# Patient Record
Sex: MALE | Race: WHITE | NOT HISPANIC OR LATINO | URBAN - METROPOLITAN AREA
[De-identification: names, ages, dates, MRNs, and addresses within clinical notes are randomized per-mention and may not be internally consistent; named-entity substitution may affect disease eponyms.]

---

## 2021-06-21 ENCOUNTER — OFFICE VISIT (OUTPATIENT)
Dept: FAMILY MEDICINE | Facility: CLINIC | Age: 36
End: 2021-06-21

## 2021-06-21 VITALS
DIASTOLIC BLOOD PRESSURE: 92 MMHG | HEIGHT: 68 IN | SYSTOLIC BLOOD PRESSURE: 128 MMHG | WEIGHT: 187 LBS | HEART RATE: 79 BPM | OXYGEN SATURATION: 99 % | BODY MASS INDEX: 28.34 KG/M2

## 2021-06-21 DIAGNOSIS — F41.9 ANXIETY: ICD-10-CM

## 2021-06-21 DIAGNOSIS — Z13.228 ENCOUNTER FOR SCREENING FOR METABOLIC DISORDER: ICD-10-CM

## 2021-06-21 DIAGNOSIS — D64.9 ANEMIA: ICD-10-CM

## 2021-06-21 DIAGNOSIS — F32.1 CURRENT MODERATE EPISODE OF MAJOR DEPRESSIVE DISORDER WITHOUT PRIOR EPISODE (H): ICD-10-CM

## 2021-06-21 DIAGNOSIS — Z13.220 SCREENING FOR LIPOID DISORDERS: ICD-10-CM

## 2021-06-21 DIAGNOSIS — Z11.59 NEED FOR HEPATITIS C SCREENING TEST: ICD-10-CM

## 2021-06-21 DIAGNOSIS — Z13.0 ENCOUNTER FOR SCREENING FOR HEMATOLOGIC DISORDER: ICD-10-CM

## 2021-06-21 DIAGNOSIS — Z00.00 ROUTINE GENERAL MEDICAL EXAMINATION AT A HEALTH CARE FACILITY: Primary | ICD-10-CM

## 2021-06-21 LAB
% GRANULOCYTES: 74.3 % (ref 42.2–75.2)
HCT VFR BLD AUTO: 35.2 % (ref 39–51)
HEMOGLOBIN: 12.2 G/DL (ref 13.4–17.5)
LYMPHOCYTES NFR BLD AUTO: 19.7 % (ref 20.5–51.1)
MCH RBC QN AUTO: 28.8 PG (ref 27–31)
MCHC RBC AUTO-ENTMCNC: 34.8 G/DL (ref 33–37)
MCV RBC AUTO: 82.6 FL (ref 80–100)
MONOCYTES NFR BLD AUTO: 6 % (ref 1.7–9.3)
PLATELET # BLD AUTO: 236 K/UL (ref 140–450)
RBC # BLD AUTO: 4.26 X10/CMM (ref 4.2–5.9)
WBC # BLD AUTO: 5.9 X10/CMM (ref 3.8–11)

## 2021-06-21 PROCEDURE — 99214 OFFICE O/P EST MOD 30 MIN: CPT | Mod: 25 | Performed by: FAMILY MEDICINE

## 2021-06-21 PROCEDURE — 99385 PREV VISIT NEW AGE 18-39: CPT | Performed by: FAMILY MEDICINE

## 2021-06-21 PROCEDURE — 85025 COMPLETE CBC W/AUTO DIFF WBC: CPT | Performed by: FAMILY MEDICINE

## 2021-06-21 PROCEDURE — 36415 COLL VENOUS BLD VENIPUNCTURE: CPT | Performed by: FAMILY MEDICINE

## 2021-06-21 RX ORDER — ESCITALOPRAM OXALATE 5 MG/1
TABLET ORAL
Qty: 90 TABLET | Refills: 0 | Status: SHIPPED | OUTPATIENT
Start: 2021-06-21 | End: 2023-04-03

## 2021-06-21 ASSESSMENT — ANXIETY QUESTIONNAIRES
GAD7 TOTAL SCORE: 17
1. FEELING NERVOUS, ANXIOUS, OR ON EDGE: NEARLY EVERY DAY
5. BEING SO RESTLESS THAT IT IS HARD TO SIT STILL: MORE THAN HALF THE DAYS
3. WORRYING TOO MUCH ABOUT DIFFERENT THINGS: NEARLY EVERY DAY
7. FEELING AFRAID AS IF SOMETHING AWFUL MIGHT HAPPEN: NOT AT ALL
2. NOT BEING ABLE TO STOP OR CONTROL WORRYING: NEARLY EVERY DAY
6. BECOMING EASILY ANNOYED OR IRRITABLE: NEARLY EVERY DAY

## 2021-06-21 ASSESSMENT — MIFFLIN-ST. JEOR: SCORE: 1738.76

## 2021-06-21 ASSESSMENT — PATIENT HEALTH QUESTIONNAIRE - PHQ9
SUM OF ALL RESPONSES TO PHQ QUESTIONS 1-9: 17
5. POOR APPETITE OR OVEREATING: NEARLY EVERY DAY

## 2021-06-21 NOTE — PROGRESS NOTES
"  3  SUBJECTIVE:   CC: Antolin Meza is an 36 year old male who presents for preventive health visit.       Patient has been advised of split billing requirements and indicates understanding: Yes     New patient here for CPE.  He reports increased low mood, worsening concentration and energy, worse sleep and anhedonia for \"a while.\"  No suicidal ideation.  Also significant anxiety and feels mind never stops.  Sleeping only a few hours a night.  Does feel very tired.  No manic symptoms.  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  Last day is in 5 days.  He is going to take some time off and re-evaluate.  No personal history of mental illness.  Has not been on medication in past.    Healthy Habits:    Do you get at least three servings of calcium containing foods daily (dairy, green leafy vegetables, etc.)? yes    Amount of exercise or daily activities, outside of work: 3-4 day(s) per week    Problems taking medications regularly No    Medication side effects: No    Have you had an eye exam in the past two years? yes    Do you see a dentist twice per year? no    Do you have sleep apnea, excessive snoring or daytime drowsiness? yes, drowsiness      -------------------------------------    Today's PHQ-2 Score:   PHQ-2 (  [employer]) 2021   Q1: Little interest or pleasure in doing things 2   Q2: Feeling down, depressed or hopeless 2   PHQ-2 Score 4       Abuse: Current or Past(Physical, Sexual or Emotional)- No  Do you feel safe in your environment? Yes    Have you ever done Advance Care Planning? (For example, a Health Directive, POLST, or a discussion with a medical provider or your loved ones about your wishes): No, advance care planning information given to patient to review.  Patient plans to discuss their wishes with loved ones or provider.      Social History     Tobacco Use     Smoking status: Former Smoker     Quit date: 2008     Years since quittin.0 " "    Smokeless tobacco: Never Used   Substance Use Topics     Alcohol use: Not on file     If you drink alcohol do you typically have >3 drinks per day or >7 drinks per week? No                      Last PSA: No results found for: PSA    Reviewed orders with patient. Reviewed health maintenance and updated orders accordingly - Yes  Lab work is in process    Reviewed and updated as needed this visit by clinical staff  Tobacco  Allergies  Meds    Surg Hx  Fam Hx          Reviewed and updated as needed this visit by Provider       Surg Hx  Fam Hx         No past medical history on file.   History reviewed. No pertinent surgical history.    ROS:  CONSTITUTIONAL: NEGATIVE for fever, chills, change in weight  INTEGUMENTARY/SKIN: NEGATIVE for worrisome rashes, moles or lesions  EYES: NEGATIVE for vision changes or irritation  ENT: NEGATIVE for ear, mouth and throat problems  RESP: NEGATIVE for significant cough or SOB  CV: NEGATIVE for chest pain, palpitations or peripheral edema  GI: NEGATIVE for nausea, abdominal pain, heartburn, or change in bowel habits   male: negative for dysuria, hematuria, decreased urinary stream, erectile dysfunction, urethral discharge  MUSCULOSKELETAL: NEGATIVE for significant arthralgias or myalgia  NEURO: NEGATIVE for weakness, dizziness or paresthesias    OBJECTIVE:   BP (!) 128/92   Pulse 79   Ht 1.721 m (5' 7.75\")   Wt 84.8 kg (187 lb)   SpO2 99%   BMI 28.64 kg/m    EXAM:  GENERAL: healthy, alert and no distress  EYES: Eyes grossly normal to inspection, PERRL and conjunctivae and sclerae normal  HENT: ear canals and TM's normal, nose and mouth without ulcers or lesions  NECK: no adenopathy, no asymmetry, masses, or scars and thyroid normal to palpation  RESP: lungs clear to auscultation - no rales, rhonchi or wheezes  CV: regular rate and rhythm, normal S1 S2, no S3 or S4, no murmur, click or rub, no peripheral edema and peripheral pulses strong  ABDOMEN: soft, nontender, no " hepatosplenomegaly, no masses and bowel sounds normal   (male): normal male genitalia without lesions or urethral discharge, no hernia  MS: no gross musculoskeletal defects noted, no edema  SKIN: no suspicious lesions or rashes  NEURO: Normal strength and tone, mentation intact and speech normal  PSYCH: mentation appears normal, judgement and insight intact and appearance well groomed    Diagnostic Test Results:  Labs reviewed in Epic    ASSESSMENT/PLAN:   1. Routine general medical examination at a health care facility  - discussed preventative guidelines, healthy diet, exercise and weight management  - VENOUS COLLECTION    2. Screening for lipoid disorders  - Lipid Panel ([facility])  - VENOUS COLLECTION    3. Encounter for screening for metabolic disorder  - Comp. Metabolic Panel (14) ([facility])  - VENOUS COLLECTION    4. Encounter for screening for hematologic disorder  - CBC with Diff/Plt ([facility])  - VENOUS COLLECTION    5. Need for hepatitis C screening test  - HCV Antibody ([facility])  - VENOUS COLLECTION    6. Current moderate episode of major depressive disorder without prior episode (H)  Discussed in detail.  Discussed pharmacologic and nonpharmacologic interventions.  He prefers trial of medication first.  Will consider therapy if not improved.  Also will focus on rest, exercise, diet, etc.    - Proper use and potential benefits, harms and side effects discussed in detail.  - follow up in 4-6 weeks, sooner if concerns  - escitalopram (LEXAPRO) 5 MG tablet; Take 1 tab daily for 2 weeks and then increase to 2 tabs daily if tolerating  Dispense: 90 tablet; Refill: 0  - VENOUS COLLECTION    7. Anxiety  See above  - escitalopram (LEXAPRO) 5 MG tablet; Take 1 tab daily for 2 weeks and then increase to 2 tabs daily if tolerating  Dispense: 90 tablet; Refill: 0  - VENOUS COLLECTION    Patient has been advised of split billing requirements and indicates understanding: Yes  COUNSELING:  Reviewed preventive health  "counseling, as reflected in patient instructions    Estimated body mass index is 28.64 kg/m  as calculated from the following:    Height as of this encounter: 1.721 m (5' 7.75\").    Weight as of this encounter: 84.8 kg (187 lb).    Weight management plan: Discussed healthy diet and exercise guidelines    He reports that he quit smoking about 13 years ago. He has never used smokeless tobacco.      Counseling Resources:  ATP IV Guidelines  Pooled Cohorts Equation Calculator  FRAX Risk Assessment  ICSI Preventive Guidelines  Dietary Guidelines for Americans, 2010  USDA's MyPlate  ASA Prophylaxis  Lung CA Screening    Kenroy Bedolla MD  [facility]  "

## 2021-06-22 LAB
ALBUMIN SERPL-MCNC: 4.8 G/DL (ref 4–5)
ALBUMIN/GLOB SERPL: 1.9 {RATIO} (ref 1.2–2.2)
ALP SERPL-CCNC: 71 IU/L (ref 48–121)
ALT SERPL-CCNC: 25 IU/L (ref 0–44)
AST SERPL-CCNC: 21 IU/L (ref 0–40)
BILIRUB SERPL-MCNC: 0.6 MG/DL (ref 0–1.2)
BUN SERPL-MCNC: 15 MG/DL (ref 6–20)
BUN/CREATININE RATIO: 14 (ref 9–20)
CALCIUM SERPL-MCNC: 9.7 MG/DL (ref 8.7–10.2)
CHLORIDE SERPLBLD-SCNC: 102 MMOL/L (ref 96–106)
CHOLEST SERPL-MCNC: 197 MG/DL (ref 100–199)
CREAT SERPL-MCNC: 1.06 MG/DL (ref 0.76–1.27)
EGFR IF AFRICN AM: 102 ML/MIN/1.73
EGFR IF NONAFRICN AM: 89 ML/MIN/1.73
GLOBULIN, TOTAL: 2.5 G/DL (ref 1.5–4.5)
GLUCOSE SERPL-MCNC: 86 MG/DL (ref 65–99)
HCV AB SERPL QL IA: <0.1 S/CO RATIO (ref 0–0.9)
HDLC SERPL-MCNC: 54 MG/DL
LDL/HDL RATIO: 2.3 RATIO (ref 0–3.6)
LDLC SERPL CALC-MCNC: 125 MG/DL (ref 0–99)
POTASSIUM SERPL-SCNC: 4.4 MMOL/L (ref 3.5–5.2)
PROT SERPL-MCNC: 7.3 G/DL (ref 6–8.5)
SODIUM SERPL-SCNC: 139 MMOL/L (ref 134–144)
TOTAL CO2: 26 MMOL/L (ref 20–29)
TRIGL SERPL-MCNC: 100 MG/DL (ref 0–149)
VLDLC SERPL CALC-MCNC: 18 MG/DL (ref 5–40)

## 2021-06-22 ASSESSMENT — ANXIETY QUESTIONNAIRES: GAD7 TOTAL SCORE: 17

## 2021-06-23 NOTE — NURSING NOTE
Kenroy Bedolla MD sent to [facility]             Please add ferritin and iron/TIBC for Dx: anemia     Thanks!      Added today  Valeria Garcia MA June 23, 2021 12:36 PM

## 2021-06-24 LAB
FERRITIN SERPL-MCNC: 169 NG/ML (ref 30–400)
IRON BINDING CAP: 286 UG/DL (ref 250–450)
IRON SATURATION: 25 % (ref 15–55)
IRON: 72 UG/DL (ref 38–169)
UIBC: 214 UG/DL (ref 111–343)

## 2021-10-17 ENCOUNTER — HEALTH MAINTENANCE LETTER (OUTPATIENT)
Age: 36
End: 2021-10-17

## 2021-11-22 ENCOUNTER — OFFICE VISIT (OUTPATIENT)
Dept: FAMILY MEDICINE | Facility: CLINIC | Age: 36
End: 2021-11-22

## 2021-11-22 VITALS
HEART RATE: 84 BPM | SYSTOLIC BLOOD PRESSURE: 138 MMHG | HEIGHT: 68 IN | TEMPERATURE: 99.1 F | BODY MASS INDEX: 31.1 KG/M2 | OXYGEN SATURATION: 98 % | DIASTOLIC BLOOD PRESSURE: 86 MMHG | RESPIRATION RATE: 16 BRPM | WEIGHT: 205.2 LBS

## 2021-11-22 DIAGNOSIS — R51.9 SUDDEN ONSET OF SEVERE HEADACHE: ICD-10-CM

## 2021-11-22 DIAGNOSIS — R51.9 NEW ONSET HEADACHE: Primary | ICD-10-CM

## 2021-11-22 PROCEDURE — 99214 OFFICE O/P EST MOD 30 MIN: CPT | Performed by: FAMILY MEDICINE

## 2021-11-22 ASSESSMENT — ANXIETY QUESTIONNAIRES
6. BECOMING EASILY ANNOYED OR IRRITABLE: SEVERAL DAYS
7. FEELING AFRAID AS IF SOMETHING AWFUL MIGHT HAPPEN: NOT AT ALL
5. BEING SO RESTLESS THAT IT IS HARD TO SIT STILL: NOT AT ALL
GAD7 TOTAL SCORE: 1
1. FEELING NERVOUS, ANXIOUS, OR ON EDGE: NOT AT ALL
3. WORRYING TOO MUCH ABOUT DIFFERENT THINGS: NOT AT ALL
2. NOT BEING ABLE TO STOP OR CONTROL WORRYING: NOT AT ALL
IF YOU CHECKED OFF ANY PROBLEMS ON THIS QUESTIONNAIRE, HOW DIFFICULT HAVE THESE PROBLEMS MADE IT FOR YOU TO DO YOUR WORK, TAKE CARE OF THINGS AT HOME, OR GET ALONG WITH OTHER PEOPLE: NOT DIFFICULT AT ALL

## 2021-11-22 ASSESSMENT — MIFFLIN-ST. JEOR: SCORE: 1821.31

## 2021-11-22 ASSESSMENT — PATIENT HEALTH QUESTIONNAIRE - PHQ9: 5. POOR APPETITE OR OVEREATING: NOT AT ALL

## 2021-11-22 NOTE — PROGRESS NOTES
"  Fritz Dangelo is a 36 year old patient who presents to clinic for evaluation.  Reports onset of new headache 4 days ago.  Sudden onset but not severe at first.  2 nights ago headache worsened to worst headache he has had.  Was out to dinner and had to go home.  Since then headache has subsided but persisted.  Now rates 3/10.  Right sided behind eye.  Mild nausea.  No photophobia or phonophobia.  Does endorse seeing some visual dots.  No double vision, dysarthria, dysphagia or unilateral weakness.  No chest pain or SOB.        Review of Systems   Constitutional, HEENT, cardiovascular, pulmonary, GI, , musculoskeletal, neuro, skin, endocrine and psych systems are negative, except as otherwise noted.      Objective    /86   Pulse 84   Temp 99.1  F (37.3  C) (Oral)   Resp 16   Ht 1.721 m (5' 7.75\")   Wt 93.1 kg (205 lb 3.2 oz)   SpO2 98%   BMI 31.43 kg/m      General: Well appearing, NAD  HEENT: PERRLA, EOMI, normal fundoscopic exam  Heart: RRR, no murmur  Chest: Lungs CTAB  Skin: Clear  Neuro: CN 2-12 intact, nonfocal exam  Psych: normal mood and affect        Assessment & Plan     New onset headache  Overall well appearing but given new onset headache and severity described 2 days ago recommend imaging.  Does not want to go to ED.  Reassuring appearance and headache has improved.  Recommend CT head without contrast and with for evaluation.  If negative will proceed with migraine treatment.  If worsens prior to imaging today recommend ED immediately.  Patient agrees with plan.  - Radiology Referral; Future    Sudden onset of severe headache  - Radiology Referral; Future    [id number]  Kenroy Bedolla MD  [facility]          "

## 2021-11-23 ASSESSMENT — ANXIETY QUESTIONNAIRES: GAD7 TOTAL SCORE: 1

## 2021-11-23 ASSESSMENT — PATIENT HEALTH QUESTIONNAIRE - PHQ9: SUM OF ALL RESPONSES TO PHQ QUESTIONS 1-9: 3

## 2021-11-23 NOTE — PROGRESS NOTES
11/22/21 faxed this office note to [facility] imaging, Atten: PA department @ [phone]    Cali Kramer,   [facility]  [phone]

## 2021-11-29 ENCOUNTER — TRANSFERRED RECORDS (OUTPATIENT)
Dept: FAMILY MEDICINE | Facility: CLINIC | Age: 36
End: 2021-11-29

## 2021-11-30 DIAGNOSIS — R51.9 NEW ONSET HEADACHE: Primary | ICD-10-CM

## 2021-11-30 RX ORDER — SUMATRIPTAN 50 MG/1
50 TABLET, FILM COATED ORAL
Qty: 30 TABLET | Refills: 0 | Status: SHIPPED | OUTPATIENT
Start: 2021-11-30 | End: 2023-04-03

## 2021-11-30 NOTE — PROGRESS NOTES
Discussed CTA results.  Recommend trial of triptan and neuro referral.  Headache is unchanged.  Will go to ED if worsens

## 2022-07-24 ENCOUNTER — HEALTH MAINTENANCE LETTER (OUTPATIENT)
Age: 37
End: 2022-07-24

## 2022-10-03 ENCOUNTER — HEALTH MAINTENANCE LETTER (OUTPATIENT)
Age: 37
End: 2022-10-03

## 2023-04-02 ASSESSMENT — PATIENT HEALTH QUESTIONNAIRE - PHQ9: 5. POOR APPETITE OR OVEREATING: NOT AT ALL

## 2023-04-02 ASSESSMENT — ENCOUNTER SYMPTOMS
CONSTIPATION: 0
SORE THROAT: 0
JOINT SWELLING: 0
DIARRHEA: 0
COUGH: 0
FEVER: 0
DIZZINESS: 0
HEARTBURN: 1
HEMATURIA: 0
SHORTNESS OF BREATH: 0
NAUSEA: 0
ARTHRALGIAS: 0
PALPITATIONS: 0
EYE PAIN: 0
DYSURIA: 0
NERVOUS/ANXIOUS: 0
HEMATOCHEZIA: 0
FREQUENCY: 1
MYALGIAS: 0
WEAKNESS: 0
CHILLS: 0
PARESTHESIAS: 0
ABDOMINAL PAIN: 0
HEADACHES: 0

## 2023-04-02 ASSESSMENT — ANXIETY QUESTIONNAIRES
IF YOU CHECKED OFF ANY PROBLEMS ON THIS QUESTIONNAIRE, HOW DIFFICULT HAVE THESE PROBLEMS MADE IT FOR YOU TO DO YOUR WORK, TAKE CARE OF THINGS AT HOME, OR GET ALONG WITH OTHER PEOPLE: NOT DIFFICULT AT ALL
2. NOT BEING ABLE TO STOP OR CONTROL WORRYING: NOT AT ALL
6. BECOMING EASILY ANNOYED OR IRRITABLE: SEVERAL DAYS
1. FEELING NERVOUS, ANXIOUS, OR ON EDGE: NOT AT ALL
5. BEING SO RESTLESS THAT IT IS HARD TO SIT STILL: NOT AT ALL
7. FEELING AFRAID AS IF SOMETHING AWFUL MIGHT HAPPEN: NOT AT ALL
3. WORRYING TOO MUCH ABOUT DIFFERENT THINGS: SEVERAL DAYS
GAD7 TOTAL SCORE: 2

## 2023-04-03 ENCOUNTER — OFFICE VISIT (OUTPATIENT)
Dept: FAMILY MEDICINE | Facility: CLINIC | Age: 38
End: 2023-04-03

## 2023-04-03 VITALS
BODY MASS INDEX: 30.17 KG/M2 | HEART RATE: 79 BPM | WEIGHT: 192.2 LBS | DIASTOLIC BLOOD PRESSURE: 95 MMHG | SYSTOLIC BLOOD PRESSURE: 143 MMHG | OXYGEN SATURATION: 98 % | HEIGHT: 67 IN

## 2023-04-03 DIAGNOSIS — Z00.00 ROUTINE GENERAL MEDICAL EXAMINATION AT A HEALTH CARE FACILITY: Primary | ICD-10-CM

## 2023-04-03 DIAGNOSIS — Z13.6 ENCOUNTER FOR SCREENING FOR CARDIOVASCULAR DISORDERS: ICD-10-CM

## 2023-04-03 DIAGNOSIS — D64.9 ANEMIA, UNSPECIFIED TYPE: ICD-10-CM

## 2023-04-03 DIAGNOSIS — R03.0 ELEVATED BLOOD PRESSURE READING WITHOUT DIAGNOSIS OF HYPERTENSION: ICD-10-CM

## 2023-04-03 DIAGNOSIS — Z23 ENCOUNTER FOR IMMUNIZATION: ICD-10-CM

## 2023-04-03 DIAGNOSIS — R74.01 TRANSAMINITIS: ICD-10-CM

## 2023-04-03 DIAGNOSIS — Z13.228 ENCOUNTER FOR SCREENING FOR METABOLIC DISORDER: ICD-10-CM

## 2023-04-03 PROBLEM — F32.1 CURRENT MODERATE EPISODE OF MAJOR DEPRESSIVE DISORDER WITHOUT PRIOR EPISODE (H): Status: RESOLVED | Noted: 2021-06-21 | Resolved: 2023-04-03

## 2023-04-03 PROBLEM — F41.9 ANXIETY: Status: RESOLVED | Noted: 2021-06-21 | Resolved: 2023-04-03

## 2023-04-03 LAB
% GRANULOCYTES: 72.5 % (ref 42.2–75.2)
CHOL/HDL RATIO (RMG): 3.5 MG/DL (ref 0–4.5)
CHOLESTEROL: 183 MG/DL (ref 100–199)
HCT VFR BLD AUTO: 44.9 % (ref 39–51)
HDL (RMG): 53 MG/DL (ref 40–?)
HEMOGLOBIN: 15.3 G/DL (ref 13.4–17.5)
LDL CALCULATED (RMG): 101 MG/DL (ref 0–130)
LYMPHOCYTES NFR BLD AUTO: 21 % (ref 20.5–51.1)
MCH RBC QN AUTO: 28.7 PG (ref 27–31)
MCHC RBC AUTO-ENTMCNC: 34 G/DL (ref 33–37)
MCV RBC AUTO: 84.5 FL (ref 80–100)
MONOCYTES NFR BLD AUTO: 6.5 % (ref 1.7–9.3)
PLATELET # BLD AUTO: 299 K/UL (ref 140–450)
RBC # BLD AUTO: 5.32 X10/CMM (ref 4.2–5.9)
TRIGLYCERIDES (RMG): 150 MG/DL (ref 0–149)
WBC # BLD AUTO: 7.6 X10/CMM (ref 3.8–11)

## 2023-04-03 PROCEDURE — 90715 TDAP VACCINE 7 YRS/> IM: CPT | Performed by: FAMILY MEDICINE

## 2023-04-03 PROCEDURE — 80061 LIPID PANEL: CPT | Mod: QW | Performed by: FAMILY MEDICINE

## 2023-04-03 PROCEDURE — 85025 COMPLETE CBC W/AUTO DIFF WBC: CPT | Performed by: FAMILY MEDICINE

## 2023-04-03 PROCEDURE — 36415 COLL VENOUS BLD VENIPUNCTURE: CPT | Performed by: FAMILY MEDICINE

## 2023-04-03 PROCEDURE — 90471 IMMUNIZATION ADMIN: CPT | Mod: 59 | Performed by: FAMILY MEDICINE

## 2023-04-03 PROCEDURE — 99395 PREV VISIT EST AGE 18-39: CPT | Mod: 25 | Performed by: FAMILY MEDICINE

## 2023-04-03 ASSESSMENT — ENCOUNTER SYMPTOMS
CONSTIPATION: 0
SHORTNESS OF BREATH: 0
FREQUENCY: 1
PARESTHESIAS: 0
NERVOUS/ANXIOUS: 0
DIZZINESS: 0
SORE THROAT: 0
ABDOMINAL PAIN: 0
CHILLS: 0
WEAKNESS: 0
ARTHRALGIAS: 0
DYSURIA: 0
FEVER: 0
EYE PAIN: 0
MYALGIAS: 0
HEARTBURN: 1
HEMATURIA: 0
HEMATOCHEZIA: 0
JOINT SWELLING: 0
NAUSEA: 0
PALPITATIONS: 0
HEADACHES: 0
COUGH: 0
DIARRHEA: 0

## 2023-04-03 ASSESSMENT — ANXIETY QUESTIONNAIRES: GAD7 TOTAL SCORE: 2

## 2023-04-03 ASSESSMENT — PATIENT HEALTH QUESTIONNAIRE - PHQ9: SUM OF ALL RESPONSES TO PHQ QUESTIONS 1-9: 5

## 2023-04-03 NOTE — PROGRESS NOTES
SUBJECTIVE:   CC: Antolin is an 37 year old who presents for preventative health visit.     Doing well.  No concerns.  Mood much improved with new job.    Healthy Habits:     Getting at least 3 servings of Calcium per day:  Yes    Bi-annual eye exam:  Yes    Dental care twice a year:  NO    Sleep apnea or symptoms of sleep apnea:  Daytime drowsiness    Diet:  Regular (no restrictions)    Frequency of exercise:  2-3 days/week    Duration of exercise:  30-45 minutes    Taking medications regularly:  Yes    Medication side effects:  Not applicable    PHQ-2 Total Score: 0    Additional concerns today:  No        -------------------------------------    Today's PHQ-2 Score:       2023     4:17 PM   PHQ-2 (  [employer])   Q1: Little interest or pleasure in doing things 0   Q2: Feeling down, depressed or hopeless 0   PHQ-2 Score 0   Q1: Little interest or pleasure in doing things Not at all   Q2: Feeling down, depressed or hopeless Not at all   PHQ-2 Score 0     Social History     Tobacco Use     Smoking status: Former     Types: Cigarettes     Quit date: 2008     Years since quittin.7     Smokeless tobacco: Never   Vaping Use     Vaping status: Not on file   Substance Use Topics     Alcohol use: Not on file             2023     4:17 PM   Alcohol Use   Prescreen: >3 drinks/day or >7 drinks/week? No       Last PSA: No results found for: PSA    Reviewed orders with patient. Reviewed health maintenance and updated orders accordingly - Yes  Lab work is in process    Reviewed and updated as needed this visit by clinical staff   Tobacco  Allergies  Meds              Reviewed and updated as needed this visit by Provider                     Review of Systems   Constitutional: Negative for chills and fever.   HENT: Negative for congestion, ear pain, hearing loss and sore throat.    Eyes: Negative for pain and visual disturbance.   Respiratory: Negative for cough and shortness of breath.    Cardiovascular: Negative  "for chest pain, palpitations and peripheral edema.   Gastrointestinal: Positive for heartburn. Negative for abdominal pain, constipation, diarrhea, hematochezia and nausea.   Genitourinary: Positive for frequency. Negative for dysuria, genital sores, hematuria, impotence, penile discharge and urgency.   Musculoskeletal: Negative for arthralgias, joint swelling and myalgias.   Skin: Negative for rash.   Neurological: Negative for dizziness, weakness, headaches and paresthesias.   Psychiatric/Behavioral: Negative for mood changes. The patient is not nervous/anxious.      CONSTITUTIONAL: NEGATIVE for fever, chills, change in weight  INTEGUMENTARY/SKIN: NEGATIVE for worrisome rashes, moles or lesions  EYES: NEGATIVE for vision changes or irritation  ENT: NEGATIVE for ear, mouth and throat problems  RESP: NEGATIVE for significant cough or SOB  CV: NEGATIVE for chest pain, palpitations or peripheral edema  GI: NEGATIVE for nausea, abdominal pain, heartburn, or change in bowel habits   male: negative for dysuria, hematuria, decreased urinary stream, erectile dysfunction, urethral discharge  MUSCULOSKELETAL: NEGATIVE for significant arthralgias or myalgia  NEURO: NEGATIVE for weakness, dizziness or paresthesias  PSYCHIATRIC: NEGATIVE for changes in mood or affect    OBJECTIVE:   BP (!) 143/95   Pulse 79   Ht 1.702 m (5' 7\")   Wt 87.2 kg (192 lb 3.2 oz)   SpO2 98%   BMI 30.10 kg/m      Physical Exam  GENERAL: healthy, alert and no distress  EYES: Eyes grossly normal to inspection, PERRL and conjunctivae and sclerae normal  HENT: ear canals and TM's normal, nose and mouth without ulcers or lesions  NECK: no adenopathy, no asymmetry, masses, or scars and thyroid normal to palpation  RESP: lungs clear to auscultation - no rales, rhonchi or wheezes  CV: regular rate and rhythm, normal S1 S2, no S3 or S4, no murmur, click or rub, no peripheral edema and peripheral pulses strong  ABDOMEN: soft, nontender, no " hepatosplenomegaly, no masses and bowel sounds normal  MS: no gross musculoskeletal defects noted, no edema  SKIN: no suspicious lesions or rashes  NEURO: Normal strength and tone, mentation intact and speech normal  PSYCH: mentation appears normal, affect normal/bright    Diagnostic Test Results:  Labs reviewed in Epic  No results found for this or any previous visit (from the past 24 hour(s)).    ASSESSMENT/PLAN:   Routine general medical examination at a health care facility  - discussed preventative guidelines, healthy diet, exercise and weight management    Anemia, unspecified type  recheck  - CBC with Diff/Plt ([facility])  - VENOUS COLLECTION    Encounter for screening for metabolic disorder  - Comp. Metabolic Panel (14) ([facility])  - VENOUS COLLECTION    Encounter for screening for cardiovascular disorders  - Lipid Profile ([facility])  - VENOUS COLLECTION    Encounter for immunization  - VACCINE ADMINISTRATION, INITIAL    Elevated blood pressure reading without diagnosis of hypertension  Monitor at home and follow up if not improved.  Asymptomatic, feels well.          Patient has been advised of split billing requirements and indicates understanding: Yes      COUNSELING:   Reviewed preventive health counseling, as reflected in patient instructions       Regular exercise       Healthy diet/nutrition        He reports that he quit smoking about 14 years ago. His smoking use included cigarettes. He has never used smokeless tobacco.        Kenroy Bedolla MD  [facility]

## 2023-04-04 LAB
ALBUMIN SERPL-MCNC: 4.7 G/DL (ref 4–5)
ALBUMIN/GLOB SERPL: 2.2 {RATIO} (ref 1.2–2.2)
ALP SERPL-CCNC: 69 IU/L (ref 44–121)
ALT SERPL-CCNC: 109 IU/L (ref 0–44)
AST SERPL-CCNC: 196 IU/L (ref 0–40)
BILIRUB SERPL-MCNC: 0.5 MG/DL (ref 0–1.2)
BUN SERPL-MCNC: 12 MG/DL (ref 6–20)
BUN/CREATININE RATIO: 13 (ref 9–20)
CALCIUM SERPL-MCNC: 9.4 MG/DL (ref 8.7–10.2)
CHLORIDE SERPLBLD-SCNC: 100 MMOL/L (ref 96–106)
CREAT SERPL-MCNC: 0.94 MG/DL (ref 0.76–1.27)
EGFR: 106 ML/MIN/1.73
GLOBULIN, TOTAL: 2.1 G/DL (ref 1.5–4.5)
GLUCOSE SERPL-MCNC: 85 MG/DL (ref 70–99)
POTASSIUM SERPL-SCNC: 4.5 MMOL/L (ref 3.5–5.2)
PROT SERPL-MCNC: 6.8 G/DL (ref 6–8.5)
SODIUM SERPL-SCNC: 139 MMOL/L (ref 134–144)
TOTAL CO2: 23 MMOL/L (ref 20–29)

## 2023-12-12 ENCOUNTER — OFFICE VISIT (OUTPATIENT)
Dept: FAMILY MEDICINE | Facility: CLINIC | Age: 38
End: 2023-12-12

## 2023-12-12 VITALS
HEART RATE: 68 BPM | OXYGEN SATURATION: 98 % | TEMPERATURE: 98.3 F | BODY MASS INDEX: 26.94 KG/M2 | DIASTOLIC BLOOD PRESSURE: 98 MMHG | WEIGHT: 172 LBS | SYSTOLIC BLOOD PRESSURE: 150 MMHG

## 2023-12-12 DIAGNOSIS — J20.9 ACUTE BRONCHITIS, UNSPECIFIED ORGANISM: Primary | ICD-10-CM

## 2023-12-12 DIAGNOSIS — R03.0 ELEVATED BLOOD PRESSURE READING WITHOUT DIAGNOSIS OF HYPERTENSION: ICD-10-CM

## 2023-12-12 PROCEDURE — 99213 OFFICE O/P EST LOW 20 MIN: CPT

## 2023-12-12 RX ORDER — BENZONATATE 100 MG/1
100 CAPSULE ORAL 3 TIMES DAILY PRN
Qty: 21 CAPSULE | Refills: 0 | Status: SHIPPED | OUTPATIENT
Start: 2023-12-12

## 2023-12-12 ASSESSMENT — ANXIETY QUESTIONNAIRES
3. WORRYING TOO MUCH ABOUT DIFFERENT THINGS: NOT AT ALL
7. FEELING AFRAID AS IF SOMETHING AWFUL MIGHT HAPPEN: NOT AT ALL
2. NOT BEING ABLE TO STOP OR CONTROL WORRYING: NOT AT ALL
GAD7 TOTAL SCORE: 0
1. FEELING NERVOUS, ANXIOUS, OR ON EDGE: NOT AT ALL
5. BEING SO RESTLESS THAT IT IS HARD TO SIT STILL: NOT AT ALL
GAD7 TOTAL SCORE: 0
6. BECOMING EASILY ANNOYED OR IRRITABLE: NOT AT ALL

## 2023-12-12 ASSESSMENT — PATIENT HEALTH QUESTIONNAIRE - PHQ9
5. POOR APPETITE OR OVEREATING: NOT AT ALL
SUM OF ALL RESPONSES TO PHQ QUESTIONS 1-9: 6

## 2023-12-12 NOTE — PROGRESS NOTES
"  Assessment & Plan     Acute bronchitis, unspecified organism  We discussed the diagnosis, pathophysiology and natural history. Rx for Benzonatate for cough, side effects reviewed. Discussed red flags including if cough were to worsen, worsening shortness of breath or fevers to follow up immediately for a CXR. Encourage good hydration. Discussed OTC saline rinses, throat lozenges and nasal steroids. Patient agreeable to plan. All questions answered.   - benzonatate (TESSALON) 100 MG capsule  Dispense: 21 capsule; Refill: 0    Elevated blood pressure reading without diagnosis of hypertension  Blood pressure was elevated at today's visit. Recommendations include monitoring blood pressures over the next 1-2 weeks and if blood pressure continues to be elevated over 140/90 recommend follow up for a blood pressure management visit. Discussed importance of a healthy weight, along with diet and exercise. Patient agreeable to plan.               BMI:   Estimated body mass index is 26.94 kg/m  as calculated from the following:    Height as of 4/3/23: 1.702 m (5' 7\").    Weight as of this encounter: 78 kg (172 lb).   Weight management plan: Discussed healthy diet and exercise guidelines    Work on weight loss  Regular exercise  See Patient Instructions    Return if symptoms worsen or fail to improve, for Follow up.    PLACIDO Javier [facility]    Subjective   Antolin is a 38 year old, presenting for the following health issues:  URI (X 1 week + - sore throat, cough (keeping up at night), runny nose/Neg Covid (4) all neg over last week)    History of Present Illness       Reason for visit:  Cough and sore throat for little over a week. Cant sleep due to cough when laying down  Symptom onset:  1-2 weeks ago  Symptoms include:  Cough ans sore throat  Symptom intensity:  Moderate  Symptom progression:  Staying the same  Had these symptoms before:  No  What makes it worse:  Laying down makes my cough much " worse    He eats 2-3 servings of fruits and vegetables daily.He consumes 0 sweetened beverage(s) daily.He exercises with enough effort to increase his heart rate 30 to 60 minutes per day.  He exercises with enough effort to increase his heart rate 4 days per week.   He is taking medications regularly.     Covid Negative at home    Concern - Cough and sore throat  Onset: 9 days ago  Description: runny nose, cough, sore throat, congestion  Intensity: chest from cough/throat 4/10  Progression of Symptoms:  same  Accompanying Signs & Symptoms: runny nose, cough-dry at night, sore throat, congestion  Previous history of similar problem: Yes, but not for this long  Precipitating factors:        Worsened by: laying down  Alleviating factors:        Improved by: better when up and about throughout the day  Therapies tried and outcome: Emergency, DayQuil, NyQuil-doesn't help      BP elevated-drinks lots of red bulls in the morning approx. 6/day. Work is stressful doing 3 peoples jobs      Review of Systems   Constitutional, HEENT, cardiovascular, pulmonary, gi and gu systems are negative, except as otherwise noted.      Objective    BP (!) 150/98   Pulse 68   Temp 98.3  F (36.8  C) (Oral)   Wt 78 kg (172 lb)   SpO2 98%   BMI 26.94 kg/m    Body mass index is 26.94 kg/m .  Physical Exam   GENERAL: healthy, alert and no distress  HENT: Right ear canal: Cerumen impaction. Left ear canal clear and TM normal, nose and mouth without ulcers or lesions  RESP: lungs clear to auscultation - no rales, rhonchi or wheezes  CV: regular rate and rhythm, normal S1 S2, no S3 or S4, no murmur, click or rub, no peripheral edema and peripheral pulses strong  MS: no gross musculoskeletal defects noted, no edema  PSYCH: mentation appears normal, affect normal/bright

## 2024-05-18 ENCOUNTER — HEALTH MAINTENANCE LETTER (OUTPATIENT)
Age: 39
End: 2024-05-18

## 2025-06-08 ENCOUNTER — HEALTH MAINTENANCE LETTER (OUTPATIENT)
Age: 40
End: 2025-06-08